# Patient Record
(demographics unavailable — no encounter records)

---
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
## 2017-09-19 NOTE — ER DOCUMENT REPORT
HPI





- HPI


Patient complains to provider of: dental abscess


Onset: This morning


Onset/Duration: Sudden


Quality of pain: Throbbing


Severity: Moderate


Pain Level: 4


Context: 





Patient states he has been having dental problems for the past few years, and 

woke up this morning with swelling around right upper tooth.  Has no local 

dentist but states he will call for follow-up appointment.


Associated Symptoms: None


Exacerbated by: Food


Relieved by: Denies


Similar symptoms previously: Yes


Recently seen / treated by doctor: No





- ROS


ROS below otherwise negative: Yes


Systems Reviewed and Negative: Yes All other systems reviewed and negative





- CONSTITUTIONAL


Constitutional: DENIES: Fever





- EENT


EENT: DENIES: Congestion





- NEURO


Neurology: DENIES: Headache





- CARDIOVASCULAR


Cardiovascular: DENIES: Chest pain





- RESPIRATORY


Respiratory: DENIES: Trouble Breathing





- GASTROINTESTINAL


Gastrointestinal: DENIES: Abdominal Pain





- URINARY


Urinary: DENIES: Dysuria





- MUSCULOSKELETAL


Musculoskeletal: DENIES: Extremity pain





- DERM


Skin Color: Normal





Past Medical History





- General


Information source: Patient





- Social History


Smoking Status: Current Every Day Smoker


Frequency of alcohol use: Occasional


Drug Abuse: Marijuana


Lives with: Family


Family History: Reviewed & Not Pertinent


Patient has suicidal ideation: No


Patient has homicidal ideation: No


Skin Medical History: Reports Hx MRSA


Traumatic Medical History: Reports: Hx Fractures - hand and nose


Past Surgical History: Reports: Hx Nose Surgery, Hx Orthopedic Surgery - R hand





- Immunizations


Immunizations up to date: Yes


Hx Diphtheria, Pertussis, Tetanus Vaccination: Yes





Vertical Provider Document





- CONSTITUTIONAL


Agree With Documented VS: Yes


Exam Limitations: No Limitations


General Appearance: WD/WN, No Apparent Distress





- INFECTION CONTROL


TRAVEL OUTSIDE OF THE [place] IN LAST 30 DAYS: No





- HEENT


HEENT: Atraumatic, Normal ENT Exam, Normocephalic


Mouth Diagram: 


  __________________________














  __________________________





 1 - mild edema around tooth with decay








- NECK


Neck: Normal Inspection





- RESPIRATORY


Respiratory: Breath Sounds Normal, No Respiratory Distress


O2 Sat by Pulse Oximetry: 99





- CARDIOVASCULAR


Cardiovascular: Regular Rate, Regular Rhythm





- MUSCULOSKELETAL/EXTREMETIES


Musculoskeletal/Extremeties: MAEW





- NEURO


Level of Consciousness: Awake, Alert, Appropriate





- DERM


Integumentary: Warm, Dry, Abscess - dental





Course





- Vital Signs


Vital signs: 


 











Temp Pulse Resp BP Pulse Ox


 


 98.3 F   78   20   129/86 H  99 


 


 09/19/17 17:17  09/19/17 17:17  09/19/17 17:17  09/19/17 17:17  09/19/17 17:17














Discharge





- Discharge


Clinical Impression: 


 Dental abscess





Condition: Good


Disposition: HOME, SELF-CARE


Additional Instructions: 


Finish all antibiotics as prescribed


Motrin as needed for pain


You must follow-up with the dentist for further evaluation of dental problems


Return as needed


Prescriptions: 


Penicillin V Potassium [Penicillin Vk 250 mg Tablet] 250 mg PO Q6 #40 tablet

## 2018-04-22 NOTE — ER DOCUMENT REPORT
ED General





- General


Chief Complaint: Toothache


Stated Complaint: MOUTH PAIN


Time Seen by Provider: 04/22/18 13:58


Notes: 





31-year-old male here with complaints of right upper tooth pain ongoing for the 

past several years but worsening over the past few days.  Pain is worse with 

breathing and and eating.  Pain is also worse with smoking cigarettes.  He has 

tried taking Goody's powder for the symptoms.  He denies any fevers chills 

drainage swelling.  He has not yet seen a dentist for this.


TRAVEL OUTSIDE OF THE [place] IN LAST 30 DAYS: No





- Related Data


Allergies/Adverse Reactions: 


 





bee stings Allergy (Uncoded 04/22/18 13:38)


 











Past Medical History





- Social History


Smoking Status: Current Every Day Smoker


Chew tobacco use (# tins/day): No


Frequency of alcohol use: Social


Drug Abuse: None


Family History: Reviewed & Not Pertinent


Patient has suicidal ideation: No


Patient has homicidal ideation: No


Renal/ Medical History: Denies: Hx Peritoneal Dialysis


Skin Medical History: Reports Hx MRSA


Traumatic Medical History: Reports: Hx Fractures - hand and nose


Past Surgical History: Reports: Hx Nose Surgery, Hx Orthopedic Surgery - R hand





- Immunizations


Immunizations up to date: Yes


Hx Diphtheria, Pertussis, Tetanus Vaccination: Yes





Review of Systems





- Review of Systems


Notes: 





See history of present illness for pertinent positive review of systems; 

otherwise all review of systems have been reviewed and are negative





Physical Exam





- Vital signs


Vitals: 





 











Temp Pulse Resp BP Pulse Ox


 


 97.7 F   64   17   133/80 H  99 


 


 04/22/18 13:41  04/22/18 13:41  04/22/18 13:41  04/22/18 13:41  04/22/18 13:41














- Notes


Notes: 





PHYSICAL EXAMINATION:





GENERAL: Well-appearing and in no acute distress.





HEAD: Atraumatic, normocephalic.





EYES: Pupils equal round and reactive to light, extraocular movements intact, 

sclera anicteric, conjunctiva are normal.





ENT: nares patent, oropharynx clear without exudates.  Moist mucous membranes.  

Significant tartar buildup where the gingiva meets the teeth.  Mild tenderness 

to palpation/percussion of the right upper teeth.  No gingival erythema 

induration fluctuance drainage.





NECK: Normal range of motion, supple without lymphadenopathy





LUNGS: CTAB and equal.  No wheezes rales or rhonchi.





HEART: Regular rate and rhythm without murmurs





EXTREMITIES: Normal range of motion, no pitting edema.  No cyanosis.





NEUROLOGICAL: Cranial nerves grossly intact. Normal sensory/motor exams.





PSYCH: Normal mood, normal affect.





SKIN: Warm, Dry, normal turgor, no rashes or lesions noted





Course





- Re-evaluation


Re-evalutation: 





04/22/18 14:08


MEDICAL DECISION MAKING:


Concern for poor dentition


Instructed patient follow-up with a dentist next day or few


Will give a dose of pain medication here and prescription for same


Patient understands and agrees to the plan of care





- Vital Signs


Vital signs: 





 











Temp Pulse Resp BP Pulse Ox


 


 97.7 F   64   17   133/80 H  99 


 


 04/22/18 13:41  04/22/18 13:41  04/22/18 13:41  04/22/18 13:41  04/22/18 13:41














Discharge





- Discharge


Clinical Impression: 


 Tartar deposits on teeth





Condition: Good


Disposition: HOME, SELF-CARE


Instructions:  Dentist


Additional Instructions: 


You were seen in the emergency department at [facility].  Please 

followup with your primary dentist in the next few days for further management/

evaluation.  Please return to the emergency department for worsening of 

symptoms or any symptom that you deem to be concerning or life-threatening.  

Thank you for allowing us to be part of your care.


Prescriptions: 


Meloxicam 7.5 mg PO DAILYP PRN #7 tablet


 PRN Reason:

## 2018-04-23 NOTE — ER DOCUMENT REPORT
ED General





- General


Chief Complaint: Toothache


Stated Complaint: MOUTH PAIN


Time Seen by Provider: 04/23/18 01:14


Mode of Arrival: Ambulatory


Information source: Patient


Notes: 





31-year-old male presents with complaints of dental pain, patient was seen here 

earlier today notes that he has bad taste in his mouth that there is drainage 

of pus.  Patient denies any fevers or chills denies any swelling


TRAVEL OUTSIDE OF THE [place] IN LAST 30 DAYS: No





- HPI


Onset: Yesterday


Onset/Duration: Persistent


Quality of pain: Achy


Severity: Mild


Pain Level: 1


Associated symptoms: Other


Exacerbated by: Food


Relieved by: Denies


Similar symptoms previously: Yes


Recently seen / treated by doctor: Yes





- Related Data


Allergies/Adverse Reactions: 


 





bee stings Allergy (Uncoded 04/23/18 00:17)


 











Past Medical History





- Social History


Smoking Status: Current Every Day Smoker


Cigarette use (# per day): Yes


Chew tobacco use (# tins/day): No


Smoking Education Provided: No


Family History: Reviewed & Not Pertinent


Renal/ Medical History: Denies: Hx Peritoneal Dialysis


Skin Medical History: Reports Hx MRSA


Traumatic Medical History: Reports: Hx Fractures - hand and nose


Past Surgical History: Reports: Hx Nose Surgery, Hx Orthopedic Surgery - R hand





- Immunizations


Immunizations up to date: Yes


Hx Diphtheria, Pertussis, Tetanus Vaccination: Yes





Review of Systems





- Review of Systems


Notes: 





REVIEW OF SYSTEMS:


CONSTITUTIONAL :  Denies fever,  chills, or sweats.  Denies recent illness.


EENT:   admits to dental pain 


CARDIOVASCULAR:  Denies chest pain.  Denies palpitations or racing or irregular 

heart beat.  Denies ankle edema.


RESPIRATORY:  Denies cough, cold, or chest congestion.  Denies shortness of 

breath, difficulty breathing, or wheezing.


GASTROINTESTINAL:  Denies abdominal pain or distention.  Denies nausea, vomiting

, or diarrhea.  Denies blood in vomitus, stools, or per rectum.  Denies black, 

tarry stools.  Denies constipation.  


GENITOURINARY:  Denies difficulty urinating, painful urination, burning, 

frequency, blood in urine, or discharge.


MUSCULOSKELETAL:  Denies back or neck pain or stiffness.  Denies joint pain or 

swelling.


SKIN:   Denies rash, lesions or sores.


HEMATOLOGIC :   Denies easy bruising or bleeding.


LYMPHATIC:  Denies swollen, enlarged glands.


NEUROLOGICAL:  Denies confusion or altered mental status.  Denies passing out 

or loss of consciousness.  Denies dizziness or lightheadedness.  Denies 

headache.  Denies weakness or paralysis or loss of use of either side.  Denies 

problems with gait or speech.  Denies sensory loss, numbness, or tingling.  

Denies seizures.


PSYCHIATRIC:  Denies anxiety or stress.  Denies depression, suicidal ideation, 

or homicidal ideation.





ALL OTHER SYSTEMS REVIEWED AND NEGATIVE.





Dictation was performed using Dragon voice recognition software 





PHYSICAL EXAMINATION:





GENERAL: Well-appearing, well-nourished and in no acute distress.





HEAD: Atraumatic, normocephalic.





EYES: Pupils equal round and reactive to light, extraocular movements intact, 

sclera anicteric, conjunctiva are normal.





ENT: admits to dental pain tooth #1, all teeth apear to have caries or 

ginigvitis associated 





NECK: Normal range of motion, supple without lymphadenopathy





LUNGS: Breath sounds clear to auscultation bilaterally and equal.  No wheezes 

rales or rhonchi.





HEART: Regular rate and rhythm without murmurs





ABDOMEN: Soft, nontender, nondistended abdomen.  No guarding, no rebound.  No 

masses appreciated.





Musculoskeletal: Normal range of motion, no pitting or edema.  No cyanosis.





NEUROLOGICAL: Cranial nerves grossly intact.  Normal speech, normal gait.  

Normal sensory, motor exams 





PSYCH: Normal mood, normal affect.





SKIN: Warm, Dry, normal turgor, no rashes or lesions noted.





Physical Exam





- Vital signs


Vitals: 


 











Temp Pulse Resp BP Pulse Ox


 


 97.3 F   73   18   130/86 H  98 


 


 04/23/18 00:17  04/23/18 00:17  04/23/18 00:17  04/23/18 00:17  04/23/18 00:17














Course





- Re-evaluation


Re-evalutation: 





04/23/18 01:49


Patient is extremely poor dentition, he will be started on antibiotics given 

for pain control patient does have a dentist follow-up with


Airway is otherwise patent


No abscess is noted no drainage is seen





After performing a Medical Screening Examination, I estimate there is LOW risk 

for a DEEP SPACE INFECTION (e.g., ADIS'S ANGINA OR RETROPHARYNGEAL ABSCESS), 

MENINGITIS, INTRACRANIAL HEMORRHAGE, or AIRWAY COMPROMISE, thus I consider the 

discharge disposition reasonable. Also, there is no evidence or peritonitis, 

sepsis, or toxicity.  I have reevaluated this patient multiple times and no 

significant life threatening changes are noted. The patient and I have 

discussed the diagnosis and risks, and we agree with discharging home with 

close follow-up with the understanding that symptoms and presentations can 

change. We also discussed returning to the Emergency Department immediately if 

new or worsening symptoms occur. We have discussed the symptoms which are most 

concerning (e.g., changing or worsening pain, trouble swallowing or breathing, 

neck stiffness or fever) that necessitate immediate return.


04/23/18 01:49








- Vital Signs


Vital signs: 


 











Temp Pulse Resp BP Pulse Ox


 


 97.3 F   73   18   130/86 H  98 


 


 04/23/18 00:17  04/23/18 00:17  04/23/18 00:17  04/23/18 00:17  04/23/18 00:17














Discharge





- Discharge


Clinical Impression: 


 Dental caries, Toothache





Condition: Stable


Disposition: HOME, SELF-CARE


Instructions:  Toothache ([facility])


Additional Instructions: 


Please follow-up with dentist in 1 week return immediately if there are any 

other concerns


Prescriptions: 


Hydrocodone/Acetaminophen [Norco 5-325 mg Tablet] 1 tab PO Q6 #8 tablet


Penicillin V Potassium [Penicillin Vk 500 mg Tablet] 500 mg PO Q6 #40 tablet